# Patient Record
Sex: MALE | Race: BLACK OR AFRICAN AMERICAN | NOT HISPANIC OR LATINO | Employment: STUDENT | URBAN - METROPOLITAN AREA
[De-identification: names, ages, dates, MRNs, and addresses within clinical notes are randomized per-mention and may not be internally consistent; named-entity substitution may affect disease eponyms.]

---

## 2023-06-10 ENCOUNTER — HOSPITAL ENCOUNTER (EMERGENCY)
Facility: HOSPITAL | Age: 23
Discharge: HOME | End: 2023-06-10
Attending: EMERGENCY MEDICINE
Payer: MEDICAID

## 2023-06-10 VITALS
HEIGHT: 72 IN | RESPIRATION RATE: 19 BRPM | TEMPERATURE: 98 F | OXYGEN SATURATION: 99 % | HEART RATE: 66 BPM | DIASTOLIC BLOOD PRESSURE: 92 MMHG | SYSTOLIC BLOOD PRESSURE: 139 MMHG | BODY MASS INDEX: 30.77 KG/M2 | WEIGHT: 227.19 LBS

## 2023-06-10 DIAGNOSIS — M54.6 CHRONIC BILATERAL THORACIC BACK PAIN: Primary | ICD-10-CM

## 2023-06-10 DIAGNOSIS — G89.29 CHRONIC BILATERAL THORACIC BACK PAIN: Primary | ICD-10-CM

## 2023-06-10 PROCEDURE — 96372 THER/PROPH/DIAG INJ SC/IM: CPT | Performed by: EMERGENCY MEDICINE

## 2023-06-10 PROCEDURE — 63600175 PHARM REV CODE 636 W HCPCS: Mod: ER | Performed by: EMERGENCY MEDICINE

## 2023-06-10 PROCEDURE — 99284 EMERGENCY DEPT VISIT MOD MDM: CPT | Mod: ER

## 2023-06-10 RX ORDER — KETOROLAC TROMETHAMINE 30 MG/ML
15 INJECTION, SOLUTION INTRAMUSCULAR; INTRAVENOUS
Status: COMPLETED | OUTPATIENT
Start: 2023-06-10 | End: 2023-06-10

## 2023-06-10 RX ORDER — METHOCARBAMOL 500 MG/1
1000 TABLET, FILM COATED ORAL 3 TIMES DAILY
Qty: 30 TABLET | Refills: 0 | Status: SHIPPED | OUTPATIENT
Start: 2023-06-10 | End: 2023-06-15

## 2023-06-10 RX ADMIN — KETOROLAC TROMETHAMINE 15 MG: 30 INJECTION INTRAMUSCULAR; INTRAVENOUS at 09:06

## 2023-06-10 NOTE — ED PROVIDER NOTES
Encounter Date: 6/10/2023       History     Chief Complaint   Patient presents with    Muscle Pain     Pt C/O pain to L back and L shoulder X 3 months with pain reproducible on palpation .  Pt denies recent injury.       22-year-old male presenting with 3 months of atraumatic upper back pain.  Patient says there was no trauma or accident that led to the pain.  He was in the  when symptoms started.  He was medically discharge.  Patient is unsure if he has [facility].  He has no primary care doctor.  He has been taking Tylenol and ibuprofen for pain.  No numbness or weakness or fever.    Review of patient's allergies indicates:  No Known Allergies  History reviewed. No pertinent past medical history.  History reviewed. No pertinent surgical history.  History reviewed. No pertinent family history.  Social History     Tobacco Use    Smoking status: Passive Smoke Exposure - Never Smoker   Substance Use Topics    Alcohol use: No    Drug use: No     Review of Systems   Constitutional:  Negative for appetite change.   Eyes:  Negative for pain.   Respiratory:  Negative for shortness of breath.    Cardiovascular:  Negative for chest pain.   Gastrointestinal:  Negative for abdominal pain, nausea and vomiting.   Genitourinary:  Negative for frequency.   Musculoskeletal:  Positive for back pain. Negative for arthralgias and neck pain.   Neurological:  Negative for headaches.   Psychiatric/Behavioral:  Negative for confusion.      Physical Exam     Initial Vitals [06/10/23 0850]   BP Pulse Resp Temp SpO2   (!) 139/92 66 19 98.3 °F (36.8 °C) 99 %      MAP       --         Physical Exam    Nursing note and vitals reviewed.  HENT:   Head: Atraumatic.   Eyes: Conjunctivae and EOM are normal.   Neck: Neck supple.   Normal range of motion.  Pulmonary/Chest: Breath sounds normal. No respiratory distress.   Musculoskeletal:      Cervical back: Normal range of motion and neck supple.      Comments: Mild tenderness to  the bilateral upper back     Neurological: He is alert and oriented to person, place, and time. He has normal strength. No cranial nerve deficit or sensory deficit.       ED Course   Procedures  Labs Reviewed - No data to display       Imaging Results    None          Medications   ketorolac injection 15 mg (has no administration in time range)     Medical Decision Making:   Initial Assessment:   22-year-old male with chronic back pain presenting with acute on chronic pain.  No concerning red flags or signs of acute spinal emergency.  Toradol given in the ED. Patient says he would [facility] and other [facility]  Recommended [facility] called the [facility] to check on his eligibility status.  [facility] also [facility] outside [facility] in the event that he does not have eligibility.  Plan for continued symptomatic treatment.  Patient will likely need physical therapy.                        Clinical Impression:   Final diagnoses:  [M54.6, G89.29] Chronic bilateral thoracic back pain (Primary)        ED Disposition Condition    Discharge Stable          ED Prescriptions       Medication Sig Dispense Start Date End Date Auth. Provider    methocarbamoL (ROBAXIN) 500 MG Tab Take 2 tablets (1,000 mg total) by mouth 3 (three) times daily. for 5 days 30 tablet 6/10/2023 6/15/2023 Jonathon Rasmussen MD          Follow-up Information       Follow up With Specialties Details Why Contact Info    [facility]  Call  To check on eligibility [address] [facility] [address]  [phone]             Jonathon Rasmussen MD  06/10/23 0903